# Patient Record
Sex: FEMALE
[De-identification: names, ages, dates, MRNs, and addresses within clinical notes are randomized per-mention and may not be internally consistent; named-entity substitution may affect disease eponyms.]

---

## 2024-09-30 PROBLEM — Z00.00 ENCOUNTER FOR PREVENTIVE HEALTH EXAMINATION: Status: ACTIVE | Noted: 2024-09-30

## 2024-10-01 ENCOUNTER — NON-APPOINTMENT (OUTPATIENT)
Age: 69
End: 2024-10-01

## 2024-10-01 DIAGNOSIS — Z83.3 FAMILY HISTORY OF DIABETES MELLITUS: ICD-10-CM

## 2024-10-01 DIAGNOSIS — Z87.898 PERSONAL HISTORY OF OTHER SPECIFIED CONDITIONS: ICD-10-CM

## 2024-10-01 DIAGNOSIS — H81.10 BENIGN PAROXYSMAL VERTIGO, UNSPECIFIED EAR: ICD-10-CM

## 2024-10-01 DIAGNOSIS — Z78.9 OTHER SPECIFIED HEALTH STATUS: ICD-10-CM

## 2024-10-01 DIAGNOSIS — E55.9 VITAMIN D DEFICIENCY, UNSPECIFIED: ICD-10-CM

## 2024-10-01 DIAGNOSIS — E66.811 OBESITY, CLASS 1: ICD-10-CM

## 2024-10-01 DIAGNOSIS — Z86.69 PERSONAL HISTORY OF OTHER DISEASES OF THE NERVOUS SYSTEM AND SENSE ORGANS: ICD-10-CM

## 2024-10-01 DIAGNOSIS — Z87.440 PERSONAL HISTORY OF URINARY (TRACT) INFECTIONS: ICD-10-CM

## 2024-10-01 DIAGNOSIS — K21.9 GASTRO-ESOPHAGEAL REFLUX DISEASE W/OUT ESOPHAGITIS: ICD-10-CM

## 2024-10-01 DIAGNOSIS — J30.9 ALLERGIC RHINITIS, UNSPECIFIED: ICD-10-CM

## 2024-10-01 RX ORDER — ATENOLOL 50 MG/1
50 TABLET ORAL TWICE DAILY
Qty: 180 | Refills: 0 | Status: ACTIVE | COMMUNITY
Start: 2024-10-01

## 2024-10-01 RX ORDER — CALCIUM CARBONATE/VITAMIN D3 500MG-5MCG
500-5 TABLET ORAL TWICE DAILY
Qty: 180 | Refills: 3 | Status: ACTIVE | COMMUNITY
Start: 2024-10-01

## 2024-10-01 RX ORDER — ERGOCALCIFEROL 1.25 MG/1
1.25 MG CAPSULE, LIQUID FILLED ORAL
Qty: 13 | Refills: 3 | Status: ACTIVE | COMMUNITY
Start: 2024-10-01

## 2024-10-02 ENCOUNTER — APPOINTMENT (OUTPATIENT)
Dept: CARDIOLOGY | Facility: CLINIC | Age: 69
End: 2024-10-02
Payer: MEDICAID

## 2024-10-02 ENCOUNTER — NON-APPOINTMENT (OUTPATIENT)
Age: 69
End: 2024-10-02

## 2024-10-02 VITALS
BODY MASS INDEX: 21.17 KG/M2 | SYSTOLIC BLOOD PRESSURE: 155 MMHG | WEIGHT: 105 LBS | HEART RATE: 77 BPM | HEIGHT: 59 IN | RESPIRATION RATE: 16 BRPM | DIASTOLIC BLOOD PRESSURE: 80 MMHG | OXYGEN SATURATION: 100 %

## 2024-10-02 VITALS — DIASTOLIC BLOOD PRESSURE: 72 MMHG | SYSTOLIC BLOOD PRESSURE: 172 MMHG | HEART RATE: 80 BPM | OXYGEN SATURATION: 100 %

## 2024-10-02 VITALS — SYSTOLIC BLOOD PRESSURE: 158 MMHG | OXYGEN SATURATION: 100 % | HEART RATE: 70 BPM | DIASTOLIC BLOOD PRESSURE: 69 MMHG

## 2024-10-02 VITALS — SYSTOLIC BLOOD PRESSURE: 164 MMHG | OXYGEN SATURATION: 100 % | HEART RATE: 75 BPM | DIASTOLIC BLOOD PRESSURE: 70 MMHG

## 2024-10-02 VITALS — OXYGEN SATURATION: 100 % | SYSTOLIC BLOOD PRESSURE: 155 MMHG | DIASTOLIC BLOOD PRESSURE: 75 MMHG | HEART RATE: 70 BPM

## 2024-10-02 VITALS — HEART RATE: 70 BPM | OXYGEN SATURATION: 100 % | SYSTOLIC BLOOD PRESSURE: 157 MMHG | DIASTOLIC BLOOD PRESSURE: 77 MMHG

## 2024-10-02 VITALS — SYSTOLIC BLOOD PRESSURE: 164 MMHG | DIASTOLIC BLOOD PRESSURE: 69 MMHG | HEART RATE: 72 BPM | OXYGEN SATURATION: 100 %

## 2024-10-02 VITALS — SYSTOLIC BLOOD PRESSURE: 156 MMHG | OXYGEN SATURATION: 100 % | HEART RATE: 72 BPM | DIASTOLIC BLOOD PRESSURE: 67 MMHG

## 2024-10-02 DIAGNOSIS — R06.83 SNORING: ICD-10-CM

## 2024-10-02 DIAGNOSIS — H35.30 UNSPECIFIED MACULAR DEGENERATION: ICD-10-CM

## 2024-10-02 DIAGNOSIS — Z71.3 DIETARY COUNSELING AND SURVEILLANCE: ICD-10-CM

## 2024-10-02 DIAGNOSIS — Z82.3 FAMILY HISTORY OF STROKE: ICD-10-CM

## 2024-10-02 DIAGNOSIS — I10 ESSENTIAL (PRIMARY) HYPERTENSION: ICD-10-CM

## 2024-10-02 DIAGNOSIS — Z82.49 FAMILY HISTORY OF ISCHEMIC HEART DISEASE AND OTHER DISEASES OF THE CIRCULATORY SYSTEM: ICD-10-CM

## 2024-10-02 DIAGNOSIS — R45.7 STATE OF EMOTIONAL SHOCK AND STRESS, UNSPECIFIED: ICD-10-CM

## 2024-10-02 DIAGNOSIS — I1A.0 RESISTANT HYPERTENSION: ICD-10-CM

## 2024-10-02 DIAGNOSIS — I44.7 LEFT BUNDLE-BRANCH BLOCK, UNSPECIFIED: ICD-10-CM

## 2024-10-02 DIAGNOSIS — E78.5 HYPERLIPIDEMIA, UNSPECIFIED: ICD-10-CM

## 2024-10-02 DIAGNOSIS — G47.33 OBSTRUCTIVE SLEEP APNEA (ADULT) (PEDIATRIC): ICD-10-CM

## 2024-10-02 PROCEDURE — 93040 RHYTHM ECG WITH REPORT: CPT | Mod: 59

## 2024-10-02 PROCEDURE — 99205 OFFICE O/P NEW HI 60 MIN: CPT | Mod: 25

## 2024-10-02 PROCEDURE — 93000 ELECTROCARDIOGRAM COMPLETE: CPT

## 2024-10-02 PROCEDURE — 99417 PROLNG OP E/M EACH 15 MIN: CPT | Mod: 25

## 2024-10-02 RX ORDER — ATORVASTATIN CALCIUM 40 MG/1
40 TABLET, FILM COATED ORAL
Qty: 90 | Refills: 3 | Status: DISCONTINUED | COMMUNITY
Start: 2024-10-01 | End: 2024-10-02

## 2024-10-02 RX ORDER — PANTOPRAZOLE SODIUM 40 MG/1
40 TABLET, DELAYED RELEASE ORAL TWICE DAILY
Qty: 180 | Refills: 3 | Status: DISCONTINUED | COMMUNITY
Start: 2024-10-01 | End: 2024-10-02

## 2024-10-02 RX ORDER — LOSARTAN POTASSIUM 50 MG/1
50 TABLET, FILM COATED ORAL DAILY
Qty: 90 | Refills: 3 | Status: ACTIVE | COMMUNITY
Start: 2024-10-01

## 2024-10-02 RX ORDER — ELECTROLYTES/DEXTROSE
100 SOLUTION, ORAL ORAL DAILY
Qty: 90 | Refills: 3 | Status: ACTIVE | COMMUNITY
Start: 2024-10-02 | End: 1900-01-01

## 2024-10-02 RX ORDER — LOSARTAN POTASSIUM AND HYDROCHLOROTHIAZIDE 12.5; 5 MG/1; MG/1
50-12.5 TABLET ORAL
Qty: 90 | Refills: 3 | Status: ACTIVE | COMMUNITY
Start: 2024-10-02

## 2024-10-02 RX ORDER — ROSUVASTATIN CALCIUM 10 MG/1
10 TABLET, FILM COATED ORAL
Qty: 90 | Refills: 3 | Status: ACTIVE | COMMUNITY
Start: 2024-10-02 | End: 1900-01-01

## 2024-10-02 RX ORDER — SPIRONOLACTONE 25 MG/1
25 TABLET ORAL DAILY
Qty: 45 | Refills: 3 | Status: ACTIVE | COMMUNITY
Start: 2024-10-02 | End: 1900-01-01

## 2024-10-02 RX ORDER — THIAMINE HCL 100 MG
100 TABLET ORAL DAILY
Qty: 90 | Refills: 3 | Status: ACTIVE | COMMUNITY
Start: 2024-10-02

## 2024-10-02 RX ORDER — VIT A/VIT C/VIT E/ZINC/COPPER 4296-226
CAPSULE ORAL DAILY
Qty: 180 | Refills: 3 | Status: ACTIVE | COMMUNITY
Start: 2024-10-02 | End: 1900-01-01

## 2024-10-02 NOTE — CARDIOLOGY SUMMARY
[de-identified] : 10/2/24, normal sinus rhythm at 73 bpm with LBBB, QRSd 139 ms and leftward axis. T-waves appeared normal. Rhythm / RR - interval analysis 10/2/24 observed 107 beats over 90 sec with mean HR 73 bpm; mean  ms (786-850); Max/Min 108%; RR SD 15 and RR CV 1%. There were no PVCs or PACs

## 2024-10-02 NOTE — PHYSICAL EXAM
[Normal Appearance] : was normal in appearance [Neck Supple] : was supple [5th Left ICS - MCL] : palpated at the 5th LICS in the midclavicular line [No Precordial Heave] : no precordial heave was noted [Heart Rate ___] : [unfilled] bpm [Rhythm Regular] : regular [Normal S1] : normal S1 [Normal S2] : normal S2 [No Gallop] : no gallop heard [No Murmur] : no murmurs heard [No Pitting Edema] : no pitting edema present [2+] : left 2+ [No Abnormalities] : the abdominal aorta was not enlarged and no bruit was heard [Normal Rate] : the respiratory rate was normal [Rate ___] : at [unfilled] breaths per minute [Clear Bilaterally] : the lungs were clear to auscultation bilaterally [Rales / Crackles Bilateral] : no rales or crackles were heard [Wheezing Bilaterally] : no wheezing was heard [Rhonchi Bilateral] : no rhonchi were heard [Decreased Breath Sounds] : breath sounds were not diminished [Distended] : distended [Soft, Nontender] : the abdomen was soft and nontender [No HSM] : no hepatosplenomegaly noted [Normal] : alert and oriented, normal memory [[place]] : showed no buffalo hump [JVP Elevated ___cm] : the JVP was not elevated [Hepatojugular Reflux] : no sustained hepatojugular reflux [Kussmaul's Sign] : no Kussmaul's sign [Apical Thrill] : no thrill palpable at the apex [Click] : no click [Pericardial Rub] : no pericardial rub [Rt] : no varicose veins of the right leg [Lt] : no varicose veins of the left leg [Right Carotid Bruit] : no bruit heard over the right carotid [Left Carotid Bruit] : no bruit heard over the left carotid [Acc Muscles Use] : no accessory muscle use [Air Hunger] : no air hunger [Distress] : no respiratory distress [Dry Cough] : no dry cough [Dilated Collat. Veins] : no collateral vein dilation [Striae] : no striae [Scar] : no scars [de-identified] : R ankle circumf 2.25" ; L ankle 6.5"

## 2024-10-02 NOTE — DISCUSSION/SUMMARY
[Hypertension] : hypertension [Not Responding to Treatment] : not responding to treatment [de-identified] : overall BP was above goal, currently on three antihypertensive medications, medications, including thiazide diuretic. This indicates resistant hypertension.  [FreeTextEntry1] :  WEIGHT GOALS:  Category				BMI range - kg/m2	BMI Prime Very severely underweight		less than 15		less than 0.60	 Severely underweight			from 15.0 to 16.0		from 0.60 to 0.64	 Underweight				from 16.0 to 18.5		from 0.64 to 0.74	 Normal (healthy weight)			from 18.5 to 25		from 0.74 to 1.0	 Overweight				from 25 to 30		from 1.0 to 1.2	 Obese Class I (Moderately obese)		from 30 to 35		from 1.2 to 1.4	 Obese Class II (Severely obese)		from 35 to 40		from 1.4 to 1.6	 Obese Class III (Very severely obese)	over 40			over 1.6	  Your current weight is 105 lbs. Given current weight and height 4'11", your calculated body mass index (BMI) is 21.2 kg/sqm. Normal BMI is 18.5-25 kg/sqm. Thus, current weight is in the normal category. Abdominal waist circumference is measured at the level of the umbilicus and is thus not a pants waist measurement. Your current abdominal waist circumference is 32". Normal abdominal waist circumference is < 32 inches for women and < 37 inches for men.  DIETARY SALT (SODIUM); DASH DIET AND BLOOD PRESSURE: To decrease the sodium in your diet:   Use fresh vegetables and foods as much as possible.  Avoid canned and processed foods. Cured meats such as lacey, ham, and sausages are high in salt.  Try using different herbs and spices in your cooking instead of salt.  In restaurants, avoid foods with sauces, cheese, and cured meats. Ask for low-sodium choices. To get more potassium in your diet, eat:  Bananas, fresh or dried apricots, peaches, citrus fruits, melons  Cauliflower, broccoli, tomatoes, carrots, raw spinach, beet greens, potatoes To get more magnesium in your diet, eat:  Whole grain foods, leafy green vegetables, dried fruits - Fish and seafood, poultry  To get more calcium in your diet, eat:  Nonfat milk, yogurt, and low-fat cheeses   [place] and sardines  Cooked dried beans  Broccoli, kale, and bok beckie  Tofu or soybean curd DASH stands for "dietary approaches to stop hypertension." The DASH diet is low in total and saturated fat. It is rich in fruits, vegetables, and low-fat dairy foods. The diet allows you to get natural fiber, calcium, and magnesium from food. It prevents or lowers high blood pressure. It can also help lower cholesterol in your blood.  Don't change how you eat all at once. It's much more likely that you'll succeed if you make only one or two small changes at a time. Wait until those changes are a habit, then make a couple more changes. Some good starting steps include:  Add one serving of vegetables to your meals at lunch and dinner. This is an easy way to help you get more vegetables in your diet.  Have a piece of fruit as an afternoon or after-dinner snack. One glass of juice at breakfast is not enough fruit in your diet.  Use half your usual amount of butter, margarine, or salad dressing.  Buy nonfat salad dressing or nonfat sour cream. Follow this guide to select your menu of meals. The number of calories we want you to eat each day will tell you how many servings you can choose from each food group. Calories: 1600 2100 2600 3100  Servings Grains 6 7  10  12   Vegetables 	 4 4  5 6 Fruit 4 5 5 6 Dairy (low-fat) 2  3 3 3   Meat, poultry, fish  1  2 2   Nuts, seeds    1 Fats and sweets 1  2  3 4 Grains and grain products like breads and cereals provide energy, fiber and vitamins. Whole grains have more of these nutrients. One serving equals one of the following: Bagel, 1/2 medium; Barley, cooked 1/2 cup; Biscuit, country style 1 medium; Bread, whole wheat, white 1 slice; Cereals, cold or cooked, 1/2 cup; Cornbread, 1 medium piece; Crackers, jay, 2; Crackers, saltine, 4; Dinner roll, 1medium; English muffin, ; Hamburger bun, ; Muffin, 1 medium; Pancake, 1 medium; Pasta, 1/2 cup cooked; Liliana, 1/2 large or 1 small; Popcorn, 1 cup popped; Pretzels, 1 ounce; Rice, white, brown, or wild, 1/2 cup cooked; Tortillas, corn or flour, 1 medium; Waffle, 1 medium; Wheat germ, 1/4 cup;  Vegetables are rich sources of potassium, magnesium, and fiber. One serving is 1/2 cup of any of the following cooked vegetables: Asparagus, Beans (green, yellow), Beets, Broccoli, [place] Sprouts, Carrots, Cauliflower, Michael, chicory, mustard and turnip (and other) greens, Corn, Kale, Lima beans, Mixed vegetables, Okra, Parsnips, Peas, green, Potatoes (1/2 medium or 1/2 cup mashed), Pumpkin, Rutabaga, Spaghetti or tomato sauce, Spinach, Squash (zucchini or yellow), Stewed tomatoes, Succotash, Sweet potatoes, Turnips, Yam  Raw vegetables: Carrots,1/2 cup; Celery, 1/2 cup; Lettuce (sabine, loose-leaf, green-leaf), 1 cup; Peppers, 1/2 cup; Spinach, 1 cup; Tomato, 1/2 Fruits and fruit juices are important sources of potassium and magnesium. Fruits also contain fiber and are low in sodium and fat. One serving equals: Any fruit juice, # cup (6 ounces); Canned or frozen fruit,  cup (includes applesauce); Dried fruit,  cup;  Fresh fruit: Apple, 1 medium; Apricots, 2 medium; Banana, 1 medium; Berries, 1/2 cup; Melon, 1 wedge, or 1/2 cup; Cherries, 10; Grapefruit, 1/2; Grapes, 15; Kiwi, 1 medium; [place], 1/2 small; Nectarine, 1 medium; Orange, 1 medium; Peach, 1 medium; Pear, 1 medium; Pineapple, 1/2 cup; Plums, 2 medium; Tangerine, 1 large Dairy foods provide protein and calcium. Use low-fat or nonfat dairy products to cut down on fat. One serving equals: Skim milk, 1 cup (8 ounces); 1% low fat milk, 1 cup (8 ounces); 2% low fat milk, 1 cup (8 ounces) nonfat dry milk powder (1/3 cup); Low-fat cottage cheese, 1 cup (8 ounces); Parmesan cheese, 1 tablespoon; Mozzarella cheese, part skim, 1/4 cup (1 ounce); Low-fat cheddar cheese, 11/2 ounces; Ricotta cheese, part skim milk or nonfat, 1/4 cup (11/2 ounces); Other low fat or nonfat cheeses (11/2 oz.); Low-fat or nonfat yogurt, fruit-flavored or plain, 1 cup (8 ounces) Low-fat or nonfat frozen yogurt, 1/2 cup (4 ounces); Note: People who can't digest dairy products can try taking lactase enzyme pills or drops (available at drug and grocery stores) when they eat dairy. There is also milk available with the enzyme already added. Or you can buy lactose-free milk. Meat, poultry, and fish are good sources of protein and magnesium. One serving equals: Lean meat including beef, veal, or pork, 3 ounces cooked; Skinless, white meat poultry including turkey, chicken, 3 ounces; Fish and shellfish, 3 ounces cooked; Low-fat luncheon meats, 1 ounce; Egg, 1 medium; Tofu, 3 ounces Note: Three ounces of cooked meat is about the size of a deck of cards. Nuts, seeds, and legumes are rich sources of energy, magnesium, potassium, protein and fiber. Nuts and seeds are also high in fat, so portions should be small. Almonds, 1/3 cup; Beans such as kidney, walter, and navy, 1/2 cup cooked; Chickpeas and lentils, 1/2 cup cooked; Cashews, 1/3 cup; Filberts, 1/3 cup; Mixed nuts, 1/3 cup; Peanut butter, 2 tablespoons; Peanuts, 1/3 cup; Sesame seeds, 2 tablespoons; Sunflower seeds, 2 tablespoons; Tofu, regular, 3 ounces; Walnuts, 1/3 cup  Following the above diet will give you about 27% fat in your diet. The goal is to have 30% or less of the calories you eat each day be from fat. To meet that goal, do not eat more than 2-3 servings daily of added fat. Also try to limit sweets. One serving equals: Butter or margarine, 1 teaspoon; Mayonnaise, 1 teaspoon; Low-fat mayonnaise, 1 tablespoon; Salad dressing, 1 tablespoon; Low-fat salad dressing, 2 tablespoons; Oil, 1 teaspoon (use olive, canola, safflower, or other vegetable oils); Candy, hard, 3 pieces; Jelly or jam, 1 tablespoon); Jell-O, 1/2 cup; Jelly beans, 1/2 ounce; Maple syrup, 1 tablespoon; Popsicle, 1; Sherbet or nonfat or low-fat frozen yogurt, 1/2 cup; Sugar, 1 tablespoon; Sugared lemonade or fruit punch, 1 cup (8 ounces); Note: Try diet fruit-flavored gelatin or frozen, canned, or fresh fruit for dessert.

## 2024-10-02 NOTE — PHYSICAL EXAM
[Normal Appearance] : was normal in appearance [Neck Supple] : was supple [5th Left ICS - MCL] : palpated at the 5th LICS in the midclavicular line [No Precordial Heave] : no precordial heave was noted [Heart Rate ___] : [unfilled] bpm [Rhythm Regular] : regular [Normal S1] : normal S1 [Normal S2] : normal S2 [No Gallop] : no gallop heard [No Murmur] : no murmurs heard [No Pitting Edema] : no pitting edema present [2+] : left 2+ [No Abnormalities] : the abdominal aorta was not enlarged and no bruit was heard [Normal Rate] : the respiratory rate was normal [Rate ___] : at [unfilled] breaths per minute [Clear Bilaterally] : the lungs were clear to auscultation bilaterally [Rales / Crackles Bilateral] : no rales or crackles were heard [Wheezing Bilaterally] : no wheezing was heard [Rhonchi Bilateral] : no rhonchi were heard [Decreased Breath Sounds] : breath sounds were not diminished [Distended] : distended [Soft, Nontender] : the abdomen was soft and nontender [No HSM] : no hepatosplenomegaly noted [Normal] : alert and oriented, normal memory [[place]] : showed no buffalo hump [JVP Elevated ___cm] : the JVP was not elevated [Hepatojugular Reflux] : no sustained hepatojugular reflux [Kussmaul's Sign] : no Kussmaul's sign [Apical Thrill] : no thrill palpable at the apex [Click] : no click [Pericardial Rub] : no pericardial rub [Rt] : no varicose veins of the right leg [Lt] : no varicose veins of the left leg [Right Carotid Bruit] : no bruit heard over the right carotid [Left Carotid Bruit] : no bruit heard over the left carotid [Acc Muscles Use] : no accessory muscle use [Air Hunger] : no air hunger [Distress] : no respiratory distress [Dry Cough] : no dry cough [Dilated Collat. Veins] : no collateral vein dilation [Striae] : no striae [Scar] : no scars [de-identified] : R ankle circumf 2.25" ; L ankle 6.5"

## 2024-10-02 NOTE — PHYSICAL EXAM
[Normal Appearance] : was normal in appearance [Neck Supple] : was supple [5th Left ICS - MCL] : palpated at the 5th LICS in the midclavicular line [No Precordial Heave] : no precordial heave was noted [Heart Rate ___] : [unfilled] bpm [Rhythm Regular] : regular [Normal S1] : normal S1 [Normal S2] : normal S2 [No Gallop] : no gallop heard [No Murmur] : no murmurs heard [No Pitting Edema] : no pitting edema present [2+] : left 2+ [No Abnormalities] : the abdominal aorta was not enlarged and no bruit was heard [Normal Rate] : the respiratory rate was normal [Rate ___] : at [unfilled] breaths per minute [Clear Bilaterally] : the lungs were clear to auscultation bilaterally [Rales / Crackles Bilateral] : no rales or crackles were heard [Wheezing Bilaterally] : no wheezing was heard [Rhonchi Bilateral] : no rhonchi were heard [Decreased Breath Sounds] : breath sounds were not diminished [Distended] : distended [Soft, Nontender] : the abdomen was soft and nontender [No HSM] : no hepatosplenomegaly noted [Normal] : alert and oriented, normal memory [[place]] : showed no buffalo hump [JVP Elevated ___cm] : the JVP was not elevated [Hepatojugular Reflux] : no sustained hepatojugular reflux [Kussmaul's Sign] : no Kussmaul's sign [Apical Thrill] : no thrill palpable at the apex [Click] : no click [Pericardial Rub] : no pericardial rub [Rt] : no varicose veins of the right leg [Lt] : no varicose veins of the left leg [Right Carotid Bruit] : no bruit heard over the right carotid [Left Carotid Bruit] : no bruit heard over the left carotid [Acc Muscles Use] : no accessory muscle use [Air Hunger] : no air hunger [Distress] : no respiratory distress [Dry Cough] : no dry cough [Dilated Collat. Veins] : no collateral vein dilation [Striae] : no striae [Scar] : no scars [de-identified] : R ankle circumf 2.25" ; L ankle 6.5"

## 2024-10-02 NOTE — DISCUSSION/SUMMARY
[Hypertension] : hypertension [Not Responding to Treatment] : not responding to treatment [de-identified] : overall BP was above goal, currently on three antihypertensive medications, medications, including thiazide diuretic. This indicates resistant hypertension.  [FreeTextEntry1] :  WEIGHT GOALS:  Category				BMI range - kg/m2	BMI Prime Very severely underweight		less than 15		less than 0.60	 Severely underweight			from 15.0 to 16.0		from 0.60 to 0.64	 Underweight				from 16.0 to 18.5		from 0.64 to 0.74	 Normal (healthy weight)			from 18.5 to 25		from 0.74 to 1.0	 Overweight				from 25 to 30		from 1.0 to 1.2	 Obese Class I (Moderately obese)		from 30 to 35		from 1.2 to 1.4	 Obese Class II (Severely obese)		from 35 to 40		from 1.4 to 1.6	 Obese Class III (Very severely obese)	over 40			over 1.6	  Your current weight is 105 lbs. Given current weight and height 4'11", your calculated body mass index (BMI) is 21.2 kg/sqm. Normal BMI is 18.5-25 kg/sqm. Thus, current weight is in the normal category. Abdominal waist circumference is measured at the level of the umbilicus and is thus not a pants waist measurement. Your current abdominal waist circumference is 32". Normal abdominal waist circumference is < 32 inches for women and < 37 inches for men.  DIETARY SALT (SODIUM); DASH DIET AND BLOOD PRESSURE: To decrease the sodium in your diet:   Use fresh vegetables and foods as much as possible.  Avoid canned and processed foods. Cured meats such as lacey, ham, and sausages are high in salt.  Try using different herbs and spices in your cooking instead of salt.  In restaurants, avoid foods with sauces, cheese, and cured meats. Ask for low-sodium choices. To get more potassium in your diet, eat:  Bananas, fresh or dried apricots, peaches, citrus fruits, melons  Cauliflower, broccoli, tomatoes, carrots, raw spinach, beet greens, potatoes To get more magnesium in your diet, eat:  Whole grain foods, leafy green vegetables, dried fruits - Fish and seafood, poultry  To get more calcium in your diet, eat:  Nonfat milk, yogurt, and low-fat cheeses   [place] and sardines  Cooked dried beans  Broccoli, kale, and bok beckie  Tofu or soybean curd DASH stands for "dietary approaches to stop hypertension." The DASH diet is low in total and saturated fat. It is rich in fruits, vegetables, and low-fat dairy foods. The diet allows you to get natural fiber, calcium, and magnesium from food. It prevents or lowers high blood pressure. It can also help lower cholesterol in your blood.  Don't change how you eat all at once. It's much more likely that you'll succeed if you make only one or two small changes at a time. Wait until those changes are a habit, then make a couple more changes. Some good starting steps include:  Add one serving of vegetables to your meals at lunch and dinner. This is an easy way to help you get more vegetables in your diet.  Have a piece of fruit as an afternoon or after-dinner snack. One glass of juice at breakfast is not enough fruit in your diet.  Use half your usual amount of butter, margarine, or salad dressing.  Buy nonfat salad dressing or nonfat sour cream. Follow this guide to select your menu of meals. The number of calories we want you to eat each day will tell you how many servings you can choose from each food group. Calories: 1600 2100 2600 3100  Servings Grains 6 7  10  12   Vegetables 	 4 4  5 6 Fruit 4 5 5 6 Dairy (low-fat) 2  3 3 3   Meat, poultry, fish  1  2 2   Nuts, seeds    1 Fats and sweets 1  2  3 4 Grains and grain products like breads and cereals provide energy, fiber and vitamins. Whole grains have more of these nutrients. One serving equals one of the following: Bagel, 1/2 medium; Barley, cooked 1/2 cup; Biscuit, country style 1 medium; Bread, whole wheat, white 1 slice; Cereals, cold or cooked, 1/2 cup; Cornbread, 1 medium piece; Crackers, jay, 2; Crackers, saltine, 4; Dinner roll, 1medium; English muffin, ; Hamburger bun, ; Muffin, 1 medium; Pancake, 1 medium; Pasta, 1/2 cup cooked; Liliana, 1/2 large or 1 small; Popcorn, 1 cup popped; Pretzels, 1 ounce; Rice, white, brown, or wild, 1/2 cup cooked; Tortillas, corn or flour, 1 medium; Waffle, 1 medium; Wheat germ, 1/4 cup;  Vegetables are rich sources of potassium, magnesium, and fiber. One serving is 1/2 cup of any of the following cooked vegetables: Asparagus, Beans (green, yellow), Beets, Broccoli, [place] Sprouts, Carrots, Cauliflower, [place], chicory, mustard and turnip (and other) greens, Corn, Kale, Lima beans, Mixed vegetables, Okra, Parsnips, Peas, green, Potatoes (1/2 medium or 1/2 cup mashed), Pumpkin, Rutabaga, Spaghetti or tomato sauce, Spinach, Squash (zucchini or yellow), Stewed tomatoes, Succotash, Sweet potatoes, Turnips, Yam  Raw vegetables: Carrots,1/2 cup; Celery, 1/2 cup; Lettuce (sabine, loose-leaf, green-leaf), 1 cup; Peppers, 1/2 cup; Spinach, 1 cup; Tomato, 1/2 Fruits and fruit juices are important sources of potassium and magnesium. Fruits also contain fiber and are low in sodium and fat. One serving equals: Any fruit juice, # cup (6 ounces); Canned or frozen fruit,  cup (includes applesauce); Dried fruit,  cup;  Fresh fruit: Apple, 1 medium; Apricots, 2 medium; Banana, 1 medium; Berries, 1/2 cup; Melon, 1 wedge, or 1/2 cup; Cherries, 10; Grapefruit, 1/2; Grapes, 15; Kiwi, 1 medium; [place], 1/2 small; Nectarine, 1 medium; Orange, 1 medium; Peach, 1 medium; Pear, 1 medium; Pineapple, 1/2 cup; Plums, 2 medium; Tangerine, 1 large Dairy foods provide protein and calcium. Use low-fat or nonfat dairy products to cut down on fat. One serving equals: Skim milk, 1 cup (8 ounces); 1% low fat milk, 1 cup (8 ounces); 2% low fat milk, 1 cup (8 ounces) nonfat dry milk powder (1/3 cup); Low-fat cottage cheese, 1 cup (8 ounces); Parmesan cheese, 1 tablespoon; Mozzarella cheese, part skim, 1/4 cup (1 ounce); Low-fat cheddar cheese, 11/2 ounces; Ricotta cheese, part skim milk or nonfat, 1/4 cup (11/2 ounces); Other low fat or nonfat cheeses (11/2 oz.); Low-fat or nonfat yogurt, fruit-flavored or plain, 1 cup (8 ounces) Low-fat or nonfat frozen yogurt, 1/2 cup (4 ounces); Note: People who can't digest dairy products can try taking lactase enzyme pills or drops (available at drug and grocery stores) when they eat dairy. There is also milk available with the enzyme already added. Or you can buy lactose-free milk. Meat, poultry, and fish are good sources of protein and magnesium. One serving equals: Lean meat including beef, veal, or pork, 3 ounces cooked; Skinless, white meat poultry including turkey, chicken, 3 ounces; Fish and shellfish, 3 ounces cooked; Low-fat luncheon meats, 1 ounce; Egg, 1 medium; Tofu, 3 ounces Note: Three ounces of cooked meat is about the size of a deck of cards. Nuts, seeds, and legumes are rich sources of energy, magnesium, potassium, protein and fiber. Nuts and seeds are also high in fat, so portions should be small. Almonds, 1/3 cup; Beans such as kidney, walter, and navy, 1/2 cup cooked; Chickpeas and lentils, 1/2 cup cooked; Cashews, 1/3 cup; Filberts, 1/3 cup; Mixed nuts, 1/3 cup; Peanut butter, 2 tablespoons; Peanuts, 1/3 cup; Sesame seeds, 2 tablespoons; Sunflower seeds, 2 tablespoons; Tofu, regular, 3 ounces; Walnuts, 1/3 cup  Following the above diet will give you about 27% fat in your diet. The goal is to have 30% or less of the calories you eat each day be from fat. To meet that goal, do not eat more than 2-3 servings daily of added fat. Also try to limit sweets. One serving equals: Butter or margarine, 1 teaspoon; Mayonnaise, 1 teaspoon; Low-fat mayonnaise, 1 tablespoon; Salad dressing, 1 tablespoon; Low-fat salad dressing, 2 tablespoons; Oil, 1 teaspoon (use olive, canola, safflower, or other vegetable oils); Candy, hard, 3 pieces; Jelly or jam, 1 tablespoon); Jell-O, 1/2 cup; Jelly beans, 1/2 ounce; Maple syrup, 1 tablespoon; Popsicle, 1; Sherbet or nonfat or low-fat frozen yogurt, 1/2 cup; Sugar, 1 tablespoon; Sugared lemonade or fruit punch, 1 cup (8 ounces); Note: Try diet fruit-flavored gelatin or frozen, canned, or fresh fruit for dessert.

## 2024-10-02 NOTE — HISTORY OF PRESENT ILLNESS
[FreeTextEntry1] : Jaqui Busch has h/o HTN, HLD, GERD and vit D deficiency. She was initially seen 10/2/24, p/w self-obtained BP well above goal on losartan 100 mg daily, HCTZ 12.5 mg daily and atenolol 50 mg twice daily. HTN was diagnosed at age 41 years, originally well controlled on atenolol, but more recently hard to control.. The patient noted periods of good BP control, followed by periods of elevated BP that was hard to control. Dhe felt that stress contributed to this pattern, noting recent stress due to cancer diagnosis of spouse. There was no HTN with pregnancy, , LMP 2011. Amlodipine 5 mg daily was stopped due to retinal fluid collection that ophthalmologist suggested was worsened by this medication. There were right retinal hemorrhages attributed to hypertension. There was no exertional or resting CP, dyspnea, palpitations, lightheadedness or excessive fatigue. There was snoring with no witnessed apnea. She was compliant with current regimen, no missed doses. She never smoked cigarettes and did not consume EtOH. There was average salt consumption. There was significant family h/o HTN and stroke. Her father had hemorrhagic stroke attributed to HTN.

## 2024-10-02 NOTE — CARDIOLOGY SUMMARY
[de-identified] : 10/2/24, normal sinus rhythm at 73 bpm with LBBB, QRSd 139 ms and leftward axis. T-waves appeared normal. Rhythm / RR - interval analysis 10/2/24 observed 107 beats over 90 sec with mean HR 73 bpm; mean  ms (786-850); Max/Min 108%; RR SD 15 and RR CV 1%. There were no PVCs or PACs

## 2024-10-02 NOTE — ADDENDUM
[FreeTextEntry1] : I spent 60 minutes face to face time with the patient, from 12:00 to 13:00 on 10/2/24. Prior to this visit, I obtained and reviewed office records of Dr. Tamra Ramon, labs 9/7/24 and self-obtained hemodynamics 9/14 - 9/30/24. This preparation took 30 minutes. Thus, total visit time was 90 minutes.

## 2024-10-02 NOTE — REVIEW OF SYSTEMS
[Negative] : Heme/Lymph [Under Stress] : under stress [Fever] : no fever [Headache] : no headache [Weight Gain (___ Lbs)] : no recent weight gain [Chills] : no chills [Feeling Fatigued] : not feeling fatigued [Weight Loss (___ Lbs)] : no recent weight loss [Suicidal] : not suicidal [Depression] : no depression

## 2024-10-02 NOTE — CARDIOLOGY SUMMARY
[de-identified] : 10/2/24, normal sinus rhythm at 73 bpm with LBBB, QRSd 139 ms and leftward axis. T-waves appeared normal. Rhythm / RR - interval analysis 10/2/24 observed 107 beats over 90 sec with mean HR 73 bpm; mean  ms (786-850); Max/Min 108%; RR SD 15 and RR CV 1%. There were no PVCs or PACs

## 2024-10-02 NOTE — DISCUSSION/SUMMARY
[Hypertension] : hypertension [Not Responding to Treatment] : not responding to treatment [de-identified] : overall BP was above goal, currently on three antihypertensive medications, medications, including thiazide diuretic. This indicates resistant hypertension.  [FreeTextEntry1] :  WEIGHT GOALS:  Category				BMI range - kg/m2	BMI Prime Very severely underweight		less than 15		less than 0.60	 Severely underweight			from 15.0 to 16.0		from 0.60 to 0.64	 Underweight				from 16.0 to 18.5		from 0.64 to 0.74	 Normal (healthy weight)			from 18.5 to 25		from 0.74 to 1.0	 Overweight				from 25 to 30		from 1.0 to 1.2	 Obese Class I (Moderately obese)		from 30 to 35		from 1.2 to 1.4	 Obese Class II (Severely obese)		from 35 to 40		from 1.4 to 1.6	 Obese Class III (Very severely obese)	over 40			over 1.6	  Your current weight is 105 lbs. Given current weight and height 4'11", your calculated body mass index (BMI) is 21.2 kg/sqm. Normal BMI is 18.5-25 kg/sqm. Thus, current weight is in the normal category. Abdominal waist circumference is measured at the level of the umbilicus and is thus not a pants waist measurement. Your current abdominal waist circumference is 32". Normal abdominal waist circumference is < 32 inches for women and < 37 inches for men.  DIETARY SALT (SODIUM); DASH DIET AND BLOOD PRESSURE: To decrease the sodium in your diet:   Use fresh vegetables and foods as much as possible.  Avoid canned and processed foods. Cured meats such as lacey, ham, and sausages are high in salt.  Try using different herbs and spices in your cooking instead of salt.  In restaurants, avoid foods with sauces, cheese, and cured meats. Ask for low-sodium choices. To get more potassium in your diet, eat:  Bananas, fresh or dried apricots, peaches, citrus fruits, melons  Cauliflower, broccoli, tomatoes, carrots, raw spinach, beet greens, potatoes To get more magnesium in your diet, eat:  Whole grain foods, leafy green vegetables, dried fruits - Fish and seafood, poultry  To get more calcium in your diet, eat:  Nonfat milk, yogurt, and low-fat cheeses   [place] and sardines  Cooked dried beans  Broccoli, kale, and bok beckie  Tofu or soybean curd DASH stands for "dietary approaches to stop hypertension." The DASH diet is low in total and saturated fat. It is rich in fruits, vegetables, and low-fat dairy foods. The diet allows you to get natural fiber, calcium, and magnesium from food. It prevents or lowers high blood pressure. It can also help lower cholesterol in your blood.  Don't change how you eat all at once. It's much more likely that you'll succeed if you make only one or two small changes at a time. Wait until those changes are a habit, then make a couple more changes. Some good starting steps include:  Add one serving of vegetables to your meals at lunch and dinner. This is an easy way to help you get more vegetables in your diet.  Have a piece of fruit as an afternoon or after-dinner snack. One glass of juice at breakfast is not enough fruit in your diet.  Use half your usual amount of butter, margarine, or salad dressing.  Buy nonfat salad dressing or nonfat sour cream. Follow this guide to select your menu of meals. The number of calories we want you to eat each day will tell you how many servings you can choose from each food group. Calories: 1600 2100 2600 3100  Servings Grains 6 7  10  12   Vegetables 	 4 4  5 6 Fruit 4 5 5 6 Dairy (low-fat) 2  3 3 3   Meat, poultry, fish  1  2 2   Nuts, seeds    1 Fats and sweets 1  2  3 4 Grains and grain products like breads and cereals provide energy, fiber and vitamins. Whole grains have more of these nutrients. One serving equals one of the following: Bagel, 1/2 medium; Barley, cooked 1/2 cup; Biscuit, country style 1 medium; Bread, whole wheat, white 1 slice; Cereals, cold or cooked, 1/2 cup; Cornbread, 1 medium piece; Crackers, jay, 2; Crackers, saltine, 4; Dinner roll, 1medium; English muffin, ; Hamburger bun, ; Muffin, 1 medium; Pancake, 1 medium; Pasta, 1/2 cup cooked; Liliana, 1/2 large or 1 small; Popcorn, 1 cup popped; Pretzels, 1 ounce; Rice, white, brown, or wild, 1/2 cup cooked; Tortillas, corn or flour, 1 medium; Waffle, 1 medium; Wheat germ, 1/4 cup;  Vegetables are rich sources of potassium, magnesium, and fiber. One serving is 1/2 cup of any of the following cooked vegetables: Asparagus, Beans (green, yellow), Beets, Broccoli, [place] Sprouts, Carrots, Cauliflower, Michael, chicory, mustard and turnip (and other) greens, Corn, Kale, Lima beans, Mixed vegetables, Okra, Parsnips, Peas, green, Potatoes (1/2 medium or 1/2 cup mashed), Pumpkin, Rutabaga, Spaghetti or tomato sauce, Spinach, Squash (zucchini or yellow), Stewed tomatoes, Succotash, Sweet potatoes, Turnips, Yam  Raw vegetables: Carrots,1/2 cup; Celery, 1/2 cup; Lettuce (sabine, loose-leaf, green-leaf), 1 cup; Peppers, 1/2 cup; Spinach, 1 cup; Tomato, 1/2 Fruits and fruit juices are important sources of potassium and magnesium. Fruits also contain fiber and are low in sodium and fat. One serving equals: Any fruit juice, # cup (6 ounces); Canned or frozen fruit,  cup (includes applesauce); Dried fruit,  cup;  Fresh fruit: Apple, 1 medium; Apricots, 2 medium; Banana, 1 medium; Berries, 1/2 cup; Melon, 1 wedge, or 1/2 cup; Cherries, 10; Grapefruit, 1/2; Grapes, 15; Kiwi, 1 medium; Geiger, 1/2 small; Nectarine, 1 medium; Orange, 1 medium; Peach, 1 medium; Pear, 1 medium; Pineapple, 1/2 cup; Plums, 2 medium; Tangerine, 1 large Dairy foods provide protein and calcium. Use low-fat or nonfat dairy products to cut down on fat. One serving equals: Skim milk, 1 cup (8 ounces); 1% low fat milk, 1 cup (8 ounces); 2% low fat milk, 1 cup (8 ounces) nonfat dry milk powder (1/3 cup); Low-fat cottage cheese, 1 cup (8 ounces); Parmesan cheese, 1 tablespoon; Mozzarella cheese, part skim, 1/4 cup (1 ounce); Low-fat cheddar cheese, 11/2 ounces; Ricotta cheese, part skim milk or nonfat, 1/4 cup (11/2 ounces); Other low fat or nonfat cheeses (11/2 oz.); Low-fat or nonfat yogurt, fruit-flavored or plain, 1 cup (8 ounces) Low-fat or nonfat frozen yogurt, 1/2 cup (4 ounces); Note: People who can't digest dairy products can try taking lactase enzyme pills or drops (available at drug and grocery stores) when they eat dairy. There is also milk available with the enzyme already added. Or you can buy lactose-free milk. Meat, poultry, and fish are good sources of protein and magnesium. One serving equals: Lean meat including beef, veal, or pork, 3 ounces cooked; Skinless, white meat poultry including turkey, chicken, 3 ounces; Fish and shellfish, 3 ounces cooked; Low-fat luncheon meats, 1 ounce; Egg, 1 medium; Tofu, 3 ounces Note: Three ounces of cooked meat is about the size of a deck of cards. Nuts, seeds, and legumes are rich sources of energy, magnesium, potassium, protein and fiber. Nuts and seeds are also high in fat, so portions should be small. Almonds, 1/3 cup; Beans such as kidney, walter, and navy, 1/2 cup cooked; Chickpeas and lentils, 1/2 cup cooked; Cashews, 1/3 cup; Filberts, 1/3 cup; Mixed nuts, 1/3 cup; Peanut butter, 2 tablespoons; Peanuts, 1/3 cup; Sesame seeds, 2 tablespoons; Sunflower seeds, 2 tablespoons; Tofu, regular, 3 ounces; Walnuts, 1/3 cup  Following the above diet will give you about 27% fat in your diet. The goal is to have 30% or less of the calories you eat each day be from fat. To meet that goal, do not eat more than 2-3 servings daily of added fat. Also try to limit sweets. One serving equals: Butter or margarine, 1 teaspoon; Mayonnaise, 1 teaspoon; Low-fat mayonnaise, 1 tablespoon; Salad dressing, 1 tablespoon; Low-fat salad dressing, 2 tablespoons; Oil, 1 teaspoon (use olive, canola, safflower, or other vegetable oils); Candy, hard, 3 pieces; Jelly or jam, 1 tablespoon); Jell-O, 1/2 cup; Jelly beans, 1/2 ounce; Maple syrup, 1 tablespoon; Popsicle, 1; Sherbet or nonfat or low-fat frozen yogurt, 1/2 cup; Sugar, 1 tablespoon; Sugared lemonade or fruit punch, 1 cup (8 ounces); Note: Try diet fruit-flavored gelatin or frozen, canned, or fresh fruit for dessert.

## 2024-10-03 ENCOUNTER — TRANSCRIPTION ENCOUNTER (OUTPATIENT)
Age: 69
End: 2024-10-03

## 2024-10-03 DIAGNOSIS — E87.1 HYPO-OSMOLALITY AND HYPONATREMIA: ICD-10-CM

## 2024-10-03 LAB
ALBUMIN SERPL ELPH-MCNC: 4.5 G/DL
ALDOSTERONE SERUM: 11.4 NG/DL
ALP BLD-CCNC: 73 U/L
ALT SERPL-CCNC: 16 U/L
ANION GAP SERPL CALC-SCNC: 12 MMOL/L
AST SERPL-CCNC: 30 U/L
BILIRUB SERPL-MCNC: 0.7 MG/DL
BUN SERPL-MCNC: 11 MG/DL
CALCIUM SERPL-MCNC: 9.4 MG/DL
CHLORIDE SERPL-SCNC: 88 MMOL/L
CHOLEST SERPL-MCNC: 142 MG/DL
CO2 SERPL-SCNC: 23 MMOL/L
CREAT SERPL-MCNC: 0.66 MG/DL
CREAT SPEC-SCNC: 17 MG/DL
EGFR: 95 ML/MIN/1.73M2
ESTIMATED AVERAGE GLUCOSE: 117 MG/DL
GLUCOSE SERPL-MCNC: 103 MG/DL
HBA1C MFR BLD HPLC: 5.7 %
HCT VFR BLD CALC: 34.4 %
HDLC SERPL-MCNC: 66 MG/DL
HGB BLD-MCNC: 11.3 G/DL
LDLC SERPL CALC-MCNC: 63 MG/DL
LDLC SERPL DIRECT ASSAY-MCNC: 71 MG/DL
MAGNESIUM SERPL-MCNC: 1.8 MG/DL
MCHC RBC-ENTMCNC: 28.3 PG
MCHC RBC-ENTMCNC: 32.8 GM/DL
MCV RBC AUTO: 86.2 FL
MICROALBUMIN 24H UR DL<=1MG/L-MCNC: <1.2 MG/DL
MICROALBUMIN/CREAT 24H UR-RTO: NORMAL MG/G
NONHDLC SERPL-MCNC: 76 MG/DL
PLATELET # BLD AUTO: 207 K/UL
POTASSIUM SERPL-SCNC: 4.2 MMOL/L
PROT SERPL-MCNC: 7.3 G/DL
RBC # BLD: 3.99 M/UL
RBC # FLD: 12.6 %
RENIN PLASMA: 10.3 PG/ML
SODIUM SERPL-SCNC: 123 MMOL/L
TRIGL SERPL-MCNC: 59 MG/DL
WBC # FLD AUTO: 5.51 K/UL

## 2024-10-04 LAB — APO LP(A) SERPL-MCNC: 27.7 NMOL/L

## 2025-08-27 ENCOUNTER — NON-APPOINTMENT (OUTPATIENT)
Age: 70
End: 2025-08-27

## 2025-08-27 ENCOUNTER — APPOINTMENT (OUTPATIENT)
Dept: CARDIOLOGY | Facility: CLINIC | Age: 70
End: 2025-08-27
Payer: MEDICARE

## 2025-08-27 VITALS
SYSTOLIC BLOOD PRESSURE: 187 MMHG | TEMPERATURE: 98.1 F | OXYGEN SATURATION: 99 % | HEIGHT: 59 IN | DIASTOLIC BLOOD PRESSURE: 80 MMHG | HEART RATE: 72 BPM | BODY MASS INDEX: 19.56 KG/M2 | WEIGHT: 97 LBS

## 2025-08-27 VITALS — DIASTOLIC BLOOD PRESSURE: 66 MMHG | OXYGEN SATURATION: 100 % | SYSTOLIC BLOOD PRESSURE: 154 MMHG | HEART RATE: 64 BPM

## 2025-08-27 VITALS — DIASTOLIC BLOOD PRESSURE: 69 MMHG | SYSTOLIC BLOOD PRESSURE: 130 MMHG | HEART RATE: 64 BPM | OXYGEN SATURATION: 100 %

## 2025-08-27 VITALS — HEART RATE: 74 BPM | SYSTOLIC BLOOD PRESSURE: 172 MMHG | DIASTOLIC BLOOD PRESSURE: 73 MMHG | OXYGEN SATURATION: 100 %

## 2025-08-27 DIAGNOSIS — Z01.810 ENCOUNTER FOR PREPROCEDURAL CARDIOVASCULAR EXAMINATION: ICD-10-CM

## 2025-08-27 DIAGNOSIS — I1A.0 RESISTANT HYPERTENSION: ICD-10-CM

## 2025-08-27 DIAGNOSIS — Z71.3 DIETARY COUNSELING AND SURVEILLANCE: ICD-10-CM

## 2025-08-27 DIAGNOSIS — E87.1 HYPO-OSMOLALITY AND HYPONATREMIA: ICD-10-CM

## 2025-08-27 DIAGNOSIS — E78.5 HYPERLIPIDEMIA, UNSPECIFIED: ICD-10-CM

## 2025-08-27 DIAGNOSIS — G47.33 OBSTRUCTIVE SLEEP APNEA (ADULT) (PEDIATRIC): ICD-10-CM

## 2025-08-27 DIAGNOSIS — I44.7 LEFT BUNDLE-BRANCH BLOCK, UNSPECIFIED: ICD-10-CM

## 2025-08-27 PROCEDURE — 99205 OFFICE O/P NEW HI 60 MIN: CPT | Mod: 25

## 2025-08-27 PROCEDURE — 93000 ELECTROCARDIOGRAM COMPLETE: CPT

## 2025-08-27 PROCEDURE — 93040 RHYTHM ECG WITH REPORT: CPT | Mod: 59

## 2025-08-27 PROCEDURE — G2212 PROLONG OUTPT/OFFICE VIS: CPT

## 2025-08-27 RX ORDER — NIFEDIPINE 30 MG/1
30 TABLET, EXTENDED RELEASE ORAL
Qty: 30 | Refills: 5 | Status: ACTIVE | COMMUNITY
Start: 2025-08-27 | End: 1900-01-01